# Patient Record
Sex: FEMALE | Race: WHITE | NOT HISPANIC OR LATINO | URBAN - METROPOLITAN AREA
[De-identification: names, ages, dates, MRNs, and addresses within clinical notes are randomized per-mention and may not be internally consistent; named-entity substitution may affect disease eponyms.]

---

## 2017-02-19 ENCOUNTER — EMERGENCY (EMERGENCY)
Facility: HOSPITAL | Age: 77
LOS: 1 days | Discharge: HOME | End: 2017-02-19
Attending: EMERGENCY MEDICINE | Admitting: EMERGENCY MEDICINE
Payer: MEDICARE

## 2017-02-19 VITALS
SYSTOLIC BLOOD PRESSURE: 190 MMHG | HEIGHT: 63 IN | OXYGEN SATURATION: 99 % | WEIGHT: 138.89 LBS | TEMPERATURE: 97 F | HEART RATE: 60 BPM | RESPIRATION RATE: 16 BRPM | DIASTOLIC BLOOD PRESSURE: 80 MMHG

## 2017-02-19 VITALS
RESPIRATION RATE: 18 BRPM | HEART RATE: 64 BPM | DIASTOLIC BLOOD PRESSURE: 67 MMHG | SYSTOLIC BLOOD PRESSURE: 122 MMHG | OXYGEN SATURATION: 99 %

## 2017-02-19 LAB
ALBUMIN SERPL ELPH-MCNC: 4.2 G/DL — SIGNIFICANT CHANGE UP (ref 3.3–5.2)
ALP SERPL-CCNC: 51 U/L — SIGNIFICANT CHANGE UP (ref 40–120)
ALT FLD-CCNC: 6 U/L — SIGNIFICANT CHANGE UP
ANION GAP SERPL CALC-SCNC: 13 MMOL/L — SIGNIFICANT CHANGE UP (ref 5–17)
APTT BLD: 30.8 SEC — SIGNIFICANT CHANGE UP (ref 27.5–37.4)
AST SERPL-CCNC: 11 U/L — SIGNIFICANT CHANGE UP
BASOPHILS # BLD AUTO: 0 K/UL — SIGNIFICANT CHANGE UP (ref 0–0.2)
BASOPHILS NFR BLD AUTO: 0.5 % — SIGNIFICANT CHANGE UP (ref 0–2)
BILIRUB SERPL-MCNC: 0.3 MG/DL — LOW (ref 0.4–2)
BUN SERPL-MCNC: 15 MG/DL — SIGNIFICANT CHANGE UP (ref 8–20)
CALCIUM SERPL-MCNC: 9.5 MG/DL — SIGNIFICANT CHANGE UP (ref 8.6–10.2)
CHLORIDE SERPL-SCNC: 100 MMOL/L — SIGNIFICANT CHANGE UP (ref 98–107)
CO2 SERPL-SCNC: 25 MMOL/L — SIGNIFICANT CHANGE UP (ref 22–29)
CREAT SERPL-MCNC: 0.61 MG/DL — SIGNIFICANT CHANGE UP (ref 0.5–1.3)
EOSINOPHIL # BLD AUTO: 0.1 K/UL — SIGNIFICANT CHANGE UP (ref 0–0.5)
EOSINOPHIL NFR BLD AUTO: 2.1 % — SIGNIFICANT CHANGE UP (ref 0–6)
GLUCOSE SERPL-MCNC: 97 MG/DL — SIGNIFICANT CHANGE UP (ref 70–115)
HCT VFR BLD CALC: 38.8 % — SIGNIFICANT CHANGE UP (ref 37–47)
HGB BLD-MCNC: 12.7 G/DL — SIGNIFICANT CHANGE UP (ref 12–16)
INR BLD: 0.97 RATIO — SIGNIFICANT CHANGE UP (ref 0.88–1.16)
LYMPHOCYTES # BLD AUTO: 1.4 K/UL — SIGNIFICANT CHANGE UP (ref 1–4.8)
LYMPHOCYTES # BLD AUTO: 32.1 % — SIGNIFICANT CHANGE UP (ref 20–55)
MCHC RBC-ENTMCNC: 32.7 G/DL — SIGNIFICANT CHANGE UP (ref 32–36)
MCHC RBC-ENTMCNC: 32.9 PG — HIGH (ref 27–31)
MCV RBC AUTO: 100.5 FL — HIGH (ref 81–99)
MONOCYTES # BLD AUTO: 0.4 K/UL — SIGNIFICANT CHANGE UP (ref 0–0.8)
MONOCYTES NFR BLD AUTO: 8.9 % — SIGNIFICANT CHANGE UP (ref 3–10)
NEUTROPHILS # BLD AUTO: 2.4 K/UL — SIGNIFICANT CHANGE UP (ref 1.8–8)
NEUTROPHILS NFR BLD AUTO: 56.2 % — SIGNIFICANT CHANGE UP (ref 37–73)
PLATELET # BLD AUTO: 212 K/UL — SIGNIFICANT CHANGE UP (ref 150–400)
POTASSIUM SERPL-MCNC: 3.7 MMOL/L — SIGNIFICANT CHANGE UP (ref 3.5–5.3)
POTASSIUM SERPL-SCNC: 3.7 MMOL/L — SIGNIFICANT CHANGE UP (ref 3.5–5.3)
PROT SERPL-MCNC: 7 G/DL — SIGNIFICANT CHANGE UP (ref 6.6–8.7)
PROTHROM AB SERPL-ACNC: 10.7 SEC — SIGNIFICANT CHANGE UP (ref 10–13.1)
RBC # BLD: 3.86 M/UL — LOW (ref 4.4–5.2)
RBC # FLD: 12.2 % — SIGNIFICANT CHANGE UP (ref 11–15.6)
SODIUM SERPL-SCNC: 138 MMOL/L — SIGNIFICANT CHANGE UP (ref 135–145)
TROPONIN T SERPL-MCNC: <0.01 NG/ML — SIGNIFICANT CHANGE UP (ref 0–0.06)
WBC # BLD: 4.4 K/UL — LOW (ref 4.8–10.8)
WBC # FLD AUTO: 4.4 K/UL — LOW (ref 4.8–10.8)

## 2017-02-19 PROCEDURE — 99285 EMERGENCY DEPT VISIT HI MDM: CPT

## 2017-02-19 PROCEDURE — 71010: CPT | Mod: 26

## 2017-02-19 PROCEDURE — 85027 COMPLETE CBC AUTOMATED: CPT

## 2017-02-19 PROCEDURE — 71045 X-RAY EXAM CHEST 1 VIEW: CPT

## 2017-02-19 PROCEDURE — 85730 THROMBOPLASTIN TIME PARTIAL: CPT

## 2017-02-19 PROCEDURE — 99284 EMERGENCY DEPT VISIT MOD MDM: CPT | Mod: 25

## 2017-02-19 PROCEDURE — 85610 PROTHROMBIN TIME: CPT

## 2017-02-19 PROCEDURE — 84484 ASSAY OF TROPONIN QUANT: CPT

## 2017-02-19 PROCEDURE — 96374 THER/PROPH/DIAG INJ IV PUSH: CPT

## 2017-02-19 PROCEDURE — 93010 ELECTROCARDIOGRAM REPORT: CPT

## 2017-02-19 PROCEDURE — 80053 COMPREHEN METABOLIC PANEL: CPT

## 2017-02-19 PROCEDURE — 93005 ELECTROCARDIOGRAM TRACING: CPT

## 2017-02-19 RX ORDER — TIZANIDINE 4 MG/1
1 TABLET ORAL
Qty: 0 | Refills: 0 | COMMUNITY

## 2017-02-19 RX ORDER — LEVOTHYROXINE SODIUM 125 MCG
1 TABLET ORAL
Qty: 0 | Refills: 0 | COMMUNITY

## 2017-02-19 RX ORDER — ASPIRIN AND DIPYRIDAMOLE 25; 200 MG/1; MG/1
1 CAPSULE, EXTENDED RELEASE ORAL
Qty: 0 | Refills: 0 | COMMUNITY

## 2017-02-19 RX ORDER — CITALOPRAM 10 MG/1
1 TABLET, FILM COATED ORAL
Qty: 0 | Refills: 0 | COMMUNITY

## 2017-02-19 RX ORDER — SODIUM CHLORIDE 9 MG/ML
3 INJECTION INTRAMUSCULAR; INTRAVENOUS; SUBCUTANEOUS ONCE
Qty: 0 | Refills: 0 | Status: COMPLETED | OUTPATIENT
Start: 2017-02-19 | End: 2017-02-19

## 2017-02-19 RX ORDER — KETOROLAC TROMETHAMINE 30 MG/ML
15 SYRINGE (ML) INJECTION ONCE
Qty: 0 | Refills: 0 | Status: DISCONTINUED | OUTPATIENT
Start: 2017-02-19 | End: 2017-02-19

## 2017-02-19 RX ORDER — OXYBUTYNIN CHLORIDE 5 MG
1 TABLET ORAL
Qty: 0 | Refills: 0 | COMMUNITY

## 2017-02-19 RX ORDER — OMEPRAZOLE 10 MG/1
1 CAPSULE, DELAYED RELEASE ORAL
Qty: 0 | Refills: 0 | COMMUNITY

## 2017-02-19 RX ADMIN — Medication 15 MILLIGRAM(S): at 11:18

## 2017-02-19 RX ADMIN — SODIUM CHLORIDE 3 MILLILITER(S): 9 INJECTION INTRAMUSCULAR; INTRAVENOUS; SUBCUTANEOUS at 11:18

## 2017-02-19 NOTE — ED PROVIDER NOTE - PROGRESS NOTE DETAILS
pain improved with toradol - d/w dr cox - patient improvement of symptoms- and presentation - given days or reproducible pain ok with d/c home and has appt this week for f/u in the office Wednesday

## 2017-02-19 NOTE — ED ADULT NURSE NOTE - OBJECTIVE STATEMENT
pt c/o chest pain for over a month with worsening in last 3 days, pt and family report pain with palpation, pt has been doing physical therapy and last week has refused to go due to pain, pt denies shortness of breath,

## 2017-02-19 NOTE — ED PROVIDER NOTE - OBJECTIVE STATEMENT
75 y/o F hx of prior CVA baseline right sided weakness arm and leg presents with several week hx of left sided chest wall pain TTP some mild cough no fevers no phlegm no back pain no new weakness, incontinence of urine no trauma

## 2018-05-02 ENCOUNTER — MEDICATION RENEWAL (OUTPATIENT)
Age: 78
End: 2018-05-02

## 2018-07-23 ENCOUNTER — RX RENEWAL (OUTPATIENT)
Age: 78
End: 2018-07-23

## 2018-07-23 RX ORDER — ROSUVASTATIN CALCIUM 5 MG/1
5 TABLET, FILM COATED ORAL
Qty: 90 | Refills: 3 | Status: ACTIVE | COMMUNITY
Start: 2018-05-02 | End: 1900-01-01

## 2019-01-02 PROBLEM — I63.9 CEREBRAL INFARCTION, UNSPECIFIED: Chronic | Status: ACTIVE | Noted: 2017-02-19

## 2019-01-02 PROBLEM — E03.9 HYPOTHYROIDISM, UNSPECIFIED: Chronic | Status: ACTIVE | Noted: 2017-02-19

## 2019-01-03 ENCOUNTER — RECORD ABSTRACTING (OUTPATIENT)
Age: 79
End: 2019-01-03

## 2019-01-03 RX ORDER — OMEPRAZOLE 20 MG/1
TABLET, DELAYED RELEASE ORAL
Refills: 0 | Status: ACTIVE | COMMUNITY

## 2019-01-03 RX ORDER — CITALOPRAM HYDROBROMIDE 20 MG/1
20 TABLET, FILM COATED ORAL
Refills: 0 | Status: ACTIVE | COMMUNITY

## 2019-01-03 RX ORDER — ASPRIN AND EXTENDED-RELEASE DIPYRIDAMOLE 25; 200 MG/1; MG/1
25-200 CAPSULE ORAL
Refills: 0 | Status: ACTIVE | COMMUNITY

## 2019-01-03 RX ORDER — METRONIDAZOLE 500 MG/1
TABLET ORAL
Refills: 0 | Status: ACTIVE | COMMUNITY

## 2019-01-03 RX ORDER — LEVOTHYROXINE SODIUM 0.17 MG/1
TABLET ORAL
Refills: 0 | Status: ACTIVE | COMMUNITY

## 2019-01-03 RX ORDER — ASPIRIN AND DIPYRIDAMOLE 25; 200 MG/1; MG/1
25-200 CAPSULE, EXTENDED RELEASE ORAL
Refills: 0 | Status: ACTIVE | COMMUNITY

## 2019-01-03 RX ORDER — TIZANIDINE HYDROCHLORIDE 6 MG/1
6 CAPSULE ORAL
Refills: 0 | Status: ACTIVE | COMMUNITY

## 2019-01-08 ENCOUNTER — OTHER (OUTPATIENT)
Age: 79
End: 2019-01-08

## 2019-01-08 ENCOUNTER — APPOINTMENT (OUTPATIENT)
Dept: CARDIOLOGY | Facility: CLINIC | Age: 79
End: 2019-01-08

## 2019-01-08 ENCOUNTER — RESULT CHARGE (OUTPATIENT)
Age: 79
End: 2019-01-08

## 2019-01-08 ENCOUNTER — APPOINTMENT (OUTPATIENT)
Dept: CARDIOLOGY | Facility: CLINIC | Age: 79
End: 2019-01-08
Payer: MEDICARE

## 2019-01-08 VITALS
WEIGHT: 108 LBS | HEART RATE: 62 BPM | BODY MASS INDEX: 21.77 KG/M2 | HEIGHT: 59 IN | RESPIRATION RATE: 14 BRPM | SYSTOLIC BLOOD PRESSURE: 132 MMHG | DIASTOLIC BLOOD PRESSURE: 67 MMHG

## 2019-01-08 DIAGNOSIS — Z86.73 PERSONAL HISTORY OF TRANSIENT ISCHEMIC ATTACK (TIA), AND CEREBRAL INFARCTION W/OUT RESIDUAL DEFICITS: ICD-10-CM

## 2019-01-08 DIAGNOSIS — E78.5 HYPERLIPIDEMIA, UNSPECIFIED: ICD-10-CM

## 2019-01-08 DIAGNOSIS — R01.1 CARDIAC MURMUR, UNSPECIFIED: ICD-10-CM

## 2019-01-08 DIAGNOSIS — I65.29 OCCLUSION AND STENOSIS OF UNSPECIFIED CAROTID ARTERY: ICD-10-CM

## 2019-01-08 DIAGNOSIS — Z86.69 PERSONAL HISTORY OF OTHER DISEASES OF THE NERVOUS SYSTEM AND SENSE ORGANS: ICD-10-CM

## 2019-01-08 DIAGNOSIS — Z00.00 ENCOUNTER FOR GENERAL ADULT MEDICAL EXAMINATION W/OUT ABNORMAL FINDINGS: ICD-10-CM

## 2019-01-08 PROCEDURE — 93000 ELECTROCARDIOGRAM COMPLETE: CPT

## 2019-01-08 PROCEDURE — 99214 OFFICE O/P EST MOD 30 MIN: CPT

## 2019-01-08 NOTE — REASON FOR VISIT
[Follow-Up - Clinic] : a clinic follow-up of [FreeTextEntry1] : The patient is a 78-year-old white female with known history of prior remote CVA and a fascia with right hemiparesthesias dating back to 2010 who presents back to the office today accompanied with her  and health aide for general checkup;\par \par It seems that her major problem is just anxiety and agitation having inability to express herself to our health aide and her  frequently.;\par \par There has been no significant symptoms of chest pain, shortness of breath, palpitations dizziness or syncope;

## 2019-01-08 NOTE — HISTORY OF PRESENT ILLNESS
[FreeTextEntry1] : The family thinks that her appetite has been somewhat off and she has lost a "few" pounds since her last visit earlier last year;\par \par She has not had a recent neurologic checkup but apparently is planned for the near future;

## 2019-01-08 NOTE — REVIEW OF SYSTEMS
[Limb Weakness (Paresis)] : limb weakness [Numbness (Hypesthesia)] : numbness [Tingling (Paresthesia)] : tingling [Depression] : depression [Anxiety] : anxiety [Under Stress] : under stress [Negative] : Heme/Lymph

## 2019-01-08 NOTE — ASSESSMENT
[FreeTextEntry1] : EKG shows normal sinus rhythm at a rate of 62 with slight left axis deviation and no acute changes;\par \par In summary the patient is a 78-year-old white female with history of cerebral stroke and right hemiparesthesias with well-controlled hypertension;\par \par Anxiety and agitation at times because of aphasia;\par \par Plan:\par \par Recommend echo and carotid duplex study by next visit within 3-4 months;\par \par Agree with neurologic evaluation in the near future to assess for agitation anxiety and aphasia;\par \par Return to this office within 4 months or p.r.n.;

## 2019-01-16 PROBLEM — R01.1 MURMUR: Status: ACTIVE | Noted: 2019-01-08

## 2019-01-16 PROBLEM — E78.5 HYPERLIPIDEMIA: Status: ACTIVE | Noted: 2018-05-02

## 2019-01-16 PROBLEM — I65.29 CAROTID STENOSIS: Status: ACTIVE | Noted: 2019-01-08

## 2019-04-24 ENCOUNTER — APPOINTMENT (OUTPATIENT)
Dept: CARDIOLOGY | Facility: CLINIC | Age: 79
End: 2019-04-24

## 2019-05-14 ENCOUNTER — APPOINTMENT (OUTPATIENT)
Dept: CARDIOLOGY | Facility: CLINIC | Age: 79
End: 2019-05-14

## 2020-09-29 NOTE — ED ADULT TRIAGE NOTE - PAIN RATING/NUMBER SCALE (0-10): ACTIVITY
Abdomen , soft, nontender, nondistended , no guarding or rigidity , no masses palpable , normal bowel sounds , Liver and Spleen , no hepatomegaly present , no hepatosplenomegaly , liver nontender , spleen not palpable
5

## 2021-03-10 NOTE — ED ADULT NURSE NOTE - CAS TRG GEN SKIN CONDITION
Abdomen , soft, nontender, nondistended , no guarding or rigidity , no masses palpable , normal bowel sounds
Warm

## 2025-03-15 NOTE — ED PROVIDER NOTE - EXITCARE/DISCHARGE INSTRUCTIONS
[facility] Hospitalists Progress Note       Date Of Service: 03/15/25    Subjective:   She denies any worsening shortness of breath and reports improving cough. Denies any abdominal discomfort but mild intermittent nausea. Reports noticing improvement in leg swelling.     Medical Decision Making:   Disposition:  Patient with multiple morbidities, continue with hospitalization.    Discussion and reviews:  Notes from last 24hrs reviewed and appreciated   Results of lab work and imaging studies from last 24hrs independently reviewed  Work up to continue as per orders and plan of care  Plan of care discussed with consultants as documented.    Today's updates and plan of care changes:    3/15:  - continue to wean oxygen as tolerated  - scheduled Duonebs now q6H per Pulm  - magnesium replacement  - continue IV Lasix 20 mg q8H and increased Metoprolol  - per Cards, recommend left and right heart cath after recovery from flu    3/14:  - weaned to 2L NC  - hyperkalemic, given Lokelma with improvement  - transaminitis improving  - continue Tamiflu and prednisone  - continue IV Lasix 20 mg q8H  - monitor strict intake and output  - continue PO Digoxin and Metoprolol, HR better controlled     Impression:  63 yo female with PMH of HFpEF, Afib on Eliquis, hypertrophic cardiomyopathy, CVA due 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     Admission plan of care:     # Acute on chronic shortness of breath 2/2 Influenza Infection  # Complex restrictive lung disease, possible underlying COPD?  # Active tobacco use disorder  -- Pulm consulted, appreciate recs.  -- neurology consulted during recent admission to r/o NM disease with no improvement with trial of pyridostigmine, low suspicion but will await lab tests to  result  -- PFTs in 2021 noted highly suggestive that patient has significant   restrictive defect in view of normal maintenance of FEV1 ratio   and obstructive ventilatory defect as FEF is severely reduced and DLCO is also severely reduced appears to have   underlying COPD in addition to restriction   -- CXR 3/13: mild central congestion, perihilar interstitial opacities and left lung base atelectasis  -- encouraged tobacco cessation  -- pending paraneoplastic panel  -- continue Tamiflu (3/13--)  -- steroids and scheduled Duonebs  -- continue home Symbicort  -- afebrile and no leukocytosis, monitor off abx for now  -- discussed tobacco cessation at length     # Acute on chronic HFpEF exacarbation  # Hypertrophic cardiomyopathy  # Moderate aortic and mitral stenosis/regurg  -- Cardiology consulted, appreciate recs.  -- proBNP 25K   -- TTE 3/1 noted EF 45%, moderate aortic regurg, moderate mitral valve stenosis, moderate TR and KS, and systolic pressure increased to 65 mmHg  -- stress test 3/4: occasional ventricular ectopy, negative for ischemia  -- strict intake and output  -- daily weights  -- continue IV Lasix 20 mg q8H due to soft pressures  -- recommend left and right heart cath after recovery from flu     # Afib with RVR  -- Cardiology consulted, appreciate recs.  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)  -- CT head (2022); distal L M1 occlusion and R ICA complete occlusion  -- s/p IV TPA leading to above complications then underwent TPA reversal  -- continue home statin     #  Hx of MSSA Pneumonia  -- s/p intubation in 2022     # Tobacco use disorder: Nicotine patch, discussed cessation at length     # Hx of alcohol use disorder: currently denies heavy or regular alcohol use        Primary Care Physician  Gulshan Bansal MD      Patient Active Problem List   Diagnosis    Hypertrophic cardiomyopathy  (CMD)    Aortic stenosis    COPD (chronic obstructive pulmonary disease)  (CMD)    Chronic diastolic (congestive) heart failure (CMD)    Essential hypertension, benign    Other hyperlipidemia    History of alcohol use disorder    Obesity (BMI 30-39.9)    Hospital discharge follow-up    Antiplatelet or antithrombotic long-term use    Coronary artery disease involving native coronary artery of native heart without angina pectoris    Ventral hernia with obstruction    Cerebrovascular accident (CVA) due to vascular occlusion  (CMD)    Alcohol use disorder    Hypertrophic cardiomyopathy  (CMD)    Chronic combined systolic and diastolic heart failure  (CMD)    Acute respiratory failure (CMD)    Dysphagia    Coagulopathy  (CMD)    COVID-19 virus infection    MSSA (methicillin susceptible Staphylococcus aureus) pneumonia  (CMD)    Prediabetes    Atrial fibrillation  (CMD)    COPD (chronic obstructive pulmonary disease)  (CMD)    Atrial fibrillation with rapid ventricular response  (CMD)    SOB (shortness of breath)    ACP (advance care planning)       DVT ppx:   apixaBAN - 5 MG  apixaBAN Tab - 5 MG     Labs   Recent Labs   Lab 03/15/25  0626 03/14/25  1047 03/14/25  0524 03/13/25  1245   SODIUM 138  --  134* 132*   POTASSIUM 4.4 4.9 5.5* 4.8   CHLORIDE 102  --  98 97   CO2 30  --  25 27   BUN 21*  --  18 17   CREATININE 0.76  --  0.78 0.83   GLUCOSE 132*  --  120* 106*   CALCIUM 8.1*  --  8.4 8.4   ALBUMIN 2.2*  --  2.3* 2.7*   AST 43*  --  54* 78*   MG 1.3*  --   --   --      Recent Labs   Lab 03/14/25  0524 03/13/25  1245 03/12/25  0505   WBC 2.6* 7.0 7.5   HGB 11.0* 10.9* 10.5*   HCT 36.1 35.1*  33.8*    191 153       Recent Labs   Lab 03/13/25  2346   INR 1.3      No results available in last 24 hours     Cultures  Microbiology Results       None             Objective:   Visit Vitals  /62   Pulse 78   Temp 97.7 °F (36.5 °C) (Oral)   Resp 20   Ht 5' 2\" (1.575 m)   Wt 71.8 kg (158 lb 4.6 oz)   SpO2 100%   BMI 28.95 kg/m²       General: NAD, AOx4  HEENT: NCAT  Lungs: Coarse bilateral breath sounds with wheezing. On 2L NC.  Heart: RRR, no M/R/G  Abd: Soft, nontender, non distended.  Bowel sounds heard in all four quadrants  Skin: Warm and dry.   Extremities: BLE 1+ pitting edema without tenderness  Neuro: Cranial nerves grossly intact intact, no focal deficits noted on exam      Current Medications:  Current Facility-Administered Medications   Medication Dose Route Frequency Provider Last Rate Last Admin    ipratropium-albuterol (DUONEB) 0.5-2.5 (3) MG/3ML nebulizer solution 3 mL  3 mL Nebulization BID Resp Matty Zamorano DO        magnesium sulfate 4 g in sterile water IVPB  4 g Intravenous Once Keely Carreon DO        [Held by provider] atorvastatin (LIPITOR) tablet 80 mg  80 mg Oral Daily Keely Carreon DO        traZODone (DESYREL) tablet 100 mg  100 mg Oral QHS Joycelyn Carreoniha DO   100 mg at 03/14/25 2121    metoPROLOL tartrate (LOPRESSOR) tablet 50 mg  50 mg Oral BID Arelis Patterson MD        sodium chloride 0.9 % injection 2 mL  2 mL Intracatheter 2 times per day Keely Carreon DO   2 mL at 03/14/25 2124    Potassium Standard Replacement Protocol (Levels 3.5 and lower)   Does not apply See Admin Instructions Zaheer CarreonaDO        Magnesium Standard Replacement Protocol   Does not apply See Admin Instructions Zaheer Carreona, DO        Phosphorus Standard Replacement Protocol   Does not apply See Admin Instructions Keely Carreon DO        predniSONE (DELTASONE) tablet 40 mg  40 mg Oral Daily with breakfast Matty Zamorano DO   40 mg at 03/14/25 0812     guaiFENesin (MUCINEX) ER tablet 1,200 mg  1,200 mg Oral 2 times per day Matty Zamorano DO   1,200 mg at 03/14/25 2120    furosemide (LASIX INJECT) injection 20 mg  20 mg Intravenous 3 times per day Arelis Patterson MD   20 mg at 03/15/25 0532    digoxin (LANOXIN) tablet 125 mcg  125 mcg Oral Daily Arelis Patterson MD   125 mcg at 03/14/25 0812    oseltamivir (TAMIFLU) capsule 75 mg  75 mg Oral 2 times per day Keely Carreon DO   75 mg at 03/14/25 2121    nicotine (NICODERM) 14 MG/24HR patch 1 patch  1 patch Transdermal Daily Keely Carreon DO   1 patch at 03/14/25 0812    apixaBAN (ELIQUIS) tablet 5 mg  5 mg Oral 2 times per day Arelis Patterson MD   5 mg at 03/14/25 2121    budesonide-formoterol (SYMBICORT) 160-4.5 MCG/ACT inhaler 2 puff  2 puff Inhalation BID Arelis Patterson MD   2 puff at 03/15/25 0825    citalopram (CeleXA) tablet 10 mg  10 mg Oral Daily Arelis Patterson MD   10 mg at 03/14/25 0856       Continuous Infusions:  Current Facility-Administered Medications   Medication Dose Route Frequency Provider Last Rate Last Admin       PRN Meds:.  Current Facility-Administered Medications   Medication Dose Route Frequency    midodrine (PROAMATINE) tablet 10 mg  10 mg Oral Q8H PRN    sodium chloride 0.9 % injection 10 mL  10 mL Intravenous PRN    sodium chloride (NORMAL SALINE) 0.9 % bolus 500 mL  500 mL Intravenous PRN    acetaminophen (TYLENOL) tablet 650 mg  650 mg Oral Q4H PRN    Or    acetaminophen (TYLENOL) suppository 650 mg  650 mg Rectal Q4H PRN    ondansetron (ZOFRAN ODT) disintegrating tablet 4 mg  4 mg Oral Q12H PRN    Or    ondansetron (ZOFRAN) injection 4 mg  4 mg Intravenous Q12H PRN    polyethylene glycol (MIRALAX) packet 17 g  17 g Oral Daily PRN    docusate sodium-sennosides (SENOKOT S) 50-8.6 MG 2 tablet  2 tablet Oral BID PRN    bisacodyl (DULCOLAX) suppository 10 mg  10 mg Rectal Daily PRN    magnesium hydroxide (MILK OF MAGNESIA) 400 MG/5ML suspension 30 mL  30 mL Oral Daily PRN     melatonin tablet 3 mg  3 mg Oral Nightly PRN            Code Status    Code Status: Full Resuscitation      I spent greater than 45 minutes coordinating care, reviewing patient's diagnostic studies, examining patient, reviewing pertinent notes, collaborating with RNs/physicians/APNs/PAs involved in patient's care, determining management plan, and discussing plan with patient at bedside.       This note was created using voice recognition technology. It may include inadvertent transcriptional errors. Any such errors should be contextually interpreted and should not be taken to alter the content or the meaning.   Note to Patient: The 21st Century Cures Act makes medical notes like these available to patients in the interest of transparency. However, be advised this is a medical document. It is intended as peer to peer communication. It is written in medical language and may contain abbreviations or verbiage that are unfamiliar. It may appear blunt or direct. Medical documents are intended to carry relevant information, facts as evident, and the clinical opinion of the practitioner.    Launch [employer] and print the 'Prescriptions from this Visit' Report